# Patient Record
Sex: MALE | Race: OTHER | NOT HISPANIC OR LATINO
[De-identification: names, ages, dates, MRNs, and addresses within clinical notes are randomized per-mention and may not be internally consistent; named-entity substitution may affect disease eponyms.]

---

## 2022-02-26 ENCOUNTER — APPOINTMENT (OUTPATIENT)
Dept: PEDIATRICS | Facility: HOSPITAL | Age: 22
End: 2022-02-26
Payer: MEDICAID

## 2022-02-26 DIAGNOSIS — Z23 ENCOUNTER FOR IMMUNIZATION: ICD-10-CM

## 2022-02-26 PROBLEM — Z00.00 ENCOUNTER FOR PREVENTIVE HEALTH EXAMINATION: Status: ACTIVE | Noted: 2022-02-26

## 2022-02-26 PROCEDURE — 0054A: CPT

## 2022-02-27 PROBLEM — Z23 ENCOUNTER FOR IMMUNIZATION: Status: ACTIVE | Noted: 2022-02-27

## 2022-02-27 NOTE — HISTORY OF PRESENT ILLNESS
[COVID-19] : COVID-19 [FreeTextEntry1] : Patient here for COVID vaccine booster \par Administered 0.3ml of Pfizer vaccine in the right deltoid by Dr. Robin Cornell\par Observed for 15 minutes with no adverse reaction.

## 2022-06-17 ENCOUNTER — OUTPATIENT (OUTPATIENT)
Dept: OUTPATIENT SERVICES | Facility: HOSPITAL | Age: 22
LOS: 1 days | End: 2022-06-17
Payer: MEDICAID

## 2022-06-17 ENCOUNTER — APPOINTMENT (OUTPATIENT)
Dept: ULTRASOUND IMAGING | Facility: IMAGING CENTER | Age: 22
End: 2022-06-17
Payer: MEDICAID

## 2022-06-17 DIAGNOSIS — Z00.8 ENCOUNTER FOR OTHER GENERAL EXAMINATION: ICD-10-CM

## 2022-06-17 PROCEDURE — 76700 US EXAM ABDOM COMPLETE: CPT | Mod: 26

## 2022-06-17 PROCEDURE — 76700 US EXAM ABDOM COMPLETE: CPT

## 2022-09-26 ENCOUNTER — OFFICE (OUTPATIENT)
Dept: URBAN - METROPOLITAN AREA CLINIC 35 | Facility: CLINIC | Age: 22
Setting detail: OPHTHALMOLOGY
End: 2022-09-26
Payer: MEDICAID

## 2022-09-26 DIAGNOSIS — H57.13: ICD-10-CM

## 2022-09-26 PROCEDURE — 92002 INTRM OPH EXAM NEW PATIENT: CPT | Performed by: OPHTHALMOLOGY

## 2022-09-26 ASSESSMENT — VISUAL ACUITY
OS_BCVA: 20/20
OD_BCVA: 20/20

## 2022-09-26 ASSESSMENT — REFRACTION_AUTOREFRACTION
OD_CYLINDER: -0.25
OD_AXIS: 104
OS_CYLINDER: -0.25
OD_SPHERE: -0.25
OS_SPHERE: -0.25
OS_AXIS: 058

## 2022-09-26 ASSESSMENT — KERATOMETRY
OD_AXISANGLE_DEGREES: 084
OD_K2POWER_DIOPTERS: 43.50
OD_K1POWER_DIOPTERS: 42.75

## 2022-09-26 ASSESSMENT — SPHEQUIV_DERIVED
OS_SPHEQUIV: -0.375
OD_SPHEQUIV: -0.375

## 2022-09-26 ASSESSMENT — AXIALLENGTH_DERIVED: OD_AL: 23.8792

## 2022-09-26 ASSESSMENT — REFRACTION_MANIFEST
OD_VA1: 20/15
OS_CYLINDER: -0.25
OD_SPHERE: PLANO
OS_AXIS: 060
OS_VA1: 20/15
OS_SPHERE: PLANO

## 2022-10-15 ENCOUNTER — EMERGENCY (EMERGENCY)
Facility: HOSPITAL | Age: 22
LOS: 0 days | Discharge: HOME | End: 2022-10-15
Attending: EMERGENCY MEDICINE

## 2022-10-15 VITALS
DIASTOLIC BLOOD PRESSURE: 75 MMHG | SYSTOLIC BLOOD PRESSURE: 113 MMHG | HEART RATE: 81 BPM | OXYGEN SATURATION: 100 % | RESPIRATION RATE: 17 BRPM

## 2022-10-15 VITALS
OXYGEN SATURATION: 100 % | HEIGHT: 68 IN | TEMPERATURE: 98 F | HEART RATE: 92 BPM | RESPIRATION RATE: 16 BRPM | WEIGHT: 196.21 LBS | DIASTOLIC BLOOD PRESSURE: 102 MMHG | SYSTOLIC BLOOD PRESSURE: 139 MMHG

## 2022-10-15 DIAGNOSIS — R00.2 PALPITATIONS: ICD-10-CM

## 2022-10-15 DIAGNOSIS — R07.89 OTHER CHEST PAIN: ICD-10-CM

## 2022-10-15 LAB
ALBUMIN SERPL ELPH-MCNC: 4 G/DL — SIGNIFICANT CHANGE UP (ref 3.3–5)
ALP SERPL-CCNC: 76 U/L — SIGNIFICANT CHANGE UP (ref 40–120)
ALT FLD-CCNC: 100 U/L — HIGH (ref 12–78)
ANION GAP SERPL CALC-SCNC: 8 MMOL/L — SIGNIFICANT CHANGE UP (ref 5–17)
AST SERPL-CCNC: 37 U/L — SIGNIFICANT CHANGE UP (ref 15–37)
BASOPHILS # BLD AUTO: 0.03 K/UL — SIGNIFICANT CHANGE UP (ref 0–0.2)
BASOPHILS NFR BLD AUTO: 0.4 % — SIGNIFICANT CHANGE UP (ref 0–2)
BILIRUB SERPL-MCNC: 0.6 MG/DL — SIGNIFICANT CHANGE UP (ref 0.2–1.2)
BUN SERPL-MCNC: 9 MG/DL — SIGNIFICANT CHANGE UP (ref 7–23)
CALCIUM SERPL-MCNC: 9 MG/DL — SIGNIFICANT CHANGE UP (ref 8.5–10.1)
CHLORIDE SERPL-SCNC: 105 MMOL/L — SIGNIFICANT CHANGE UP (ref 96–108)
CK SERPL-CCNC: 94 U/L — SIGNIFICANT CHANGE UP (ref 26–308)
CO2 SERPL-SCNC: 26 MMOL/L — SIGNIFICANT CHANGE UP (ref 22–31)
CREAT SERPL-MCNC: 1.05 MG/DL — SIGNIFICANT CHANGE UP (ref 0.5–1.3)
D DIMER BLD IA.RAPID-MCNC: <150 NG/ML DDU — SIGNIFICANT CHANGE UP
EGFR: 104 ML/MIN/1.73M2 — SIGNIFICANT CHANGE UP
EOSINOPHIL # BLD AUTO: 0.18 K/UL — SIGNIFICANT CHANGE UP (ref 0–0.5)
EOSINOPHIL NFR BLD AUTO: 2.3 % — SIGNIFICANT CHANGE UP (ref 0–6)
GLUCOSE SERPL-MCNC: 96 MG/DL — SIGNIFICANT CHANGE UP (ref 70–99)
HCT VFR BLD CALC: 44.7 % — SIGNIFICANT CHANGE UP (ref 39–50)
HGB BLD-MCNC: 15.1 G/DL — SIGNIFICANT CHANGE UP (ref 13–17)
IMM GRANULOCYTES NFR BLD AUTO: 0.4 % — SIGNIFICANT CHANGE UP (ref 0–0.9)
LYMPHOCYTES # BLD AUTO: 2.88 K/UL — SIGNIFICANT CHANGE UP (ref 1–3.3)
LYMPHOCYTES # BLD AUTO: 36.8 % — SIGNIFICANT CHANGE UP (ref 13–44)
MCHC RBC-ENTMCNC: 26.7 PG — LOW (ref 27–34)
MCHC RBC-ENTMCNC: 33.8 G/DL — SIGNIFICANT CHANGE UP (ref 32–36)
MCV RBC AUTO: 79 FL — LOW (ref 80–100)
MONOCYTES # BLD AUTO: 0.5 K/UL — SIGNIFICANT CHANGE UP (ref 0–0.9)
MONOCYTES NFR BLD AUTO: 6.4 % — SIGNIFICANT CHANGE UP (ref 2–14)
NEUTROPHILS # BLD AUTO: 4.2 K/UL — SIGNIFICANT CHANGE UP (ref 1.8–7.4)
NEUTROPHILS NFR BLD AUTO: 53.7 % — SIGNIFICANT CHANGE UP (ref 43–77)
NRBC # BLD: 0 /100 WBCS — SIGNIFICANT CHANGE UP (ref 0–0)
PLATELET # BLD AUTO: 232 K/UL — SIGNIFICANT CHANGE UP (ref 150–400)
POTASSIUM SERPL-MCNC: 4.2 MMOL/L — SIGNIFICANT CHANGE UP (ref 3.5–5.3)
POTASSIUM SERPL-SCNC: 4.2 MMOL/L — SIGNIFICANT CHANGE UP (ref 3.5–5.3)
PROT SERPL-MCNC: 7.8 GM/DL — SIGNIFICANT CHANGE UP (ref 6–8.3)
RBC # BLD: 5.66 M/UL — SIGNIFICANT CHANGE UP (ref 4.2–5.8)
RBC # FLD: 13.7 % — SIGNIFICANT CHANGE UP (ref 10.3–14.5)
SODIUM SERPL-SCNC: 139 MMOL/L — SIGNIFICANT CHANGE UP (ref 135–145)
TROPONIN I, HIGH SENSITIVITY RESULT: 4.8 NG/L — SIGNIFICANT CHANGE UP
WBC # BLD: 7.82 K/UL — SIGNIFICANT CHANGE UP (ref 3.8–10.5)
WBC # FLD AUTO: 7.82 K/UL — SIGNIFICANT CHANGE UP (ref 3.8–10.5)

## 2022-10-15 PROCEDURE — 99285 EMERGENCY DEPT VISIT HI MDM: CPT

## 2022-10-15 PROCEDURE — 93010 ELECTROCARDIOGRAM REPORT: CPT

## 2022-10-15 PROCEDURE — 71046 X-RAY EXAM CHEST 2 VIEWS: CPT | Mod: 26

## 2022-10-15 NOTE — ED ADULT TRIAGE NOTE - CHIEF COMPLAINT QUOTE
brought to main for EKG Pt c/o "funny feeling in chest for 5 days on and off" Pt denies pain  denies having any SOB denies hx anxiety

## 2022-10-15 NOTE — ED PROVIDER NOTE - CLINICAL SUMMARY MEDICAL DECISION MAKING FREE TEXT BOX
21 year old with intermittent left sided chest pain.  No cardiac history.  DDx: muscular pain, r/o PNA; low suspicion for PE; acid reflux.  Will check EKG, Labs, CXR and Re-eval.

## 2022-10-15 NOTE — ED PROVIDER NOTE - PATIENT PORTAL LINK FT
You can access the FollowMyHealth Patient Portal offered by [facility] by registering at the following website: [email or web address]. By joining [facility]’s FollowMyHealth portal, you will also be able to view your health information using other applications (apps) compatible with our system.

## 2022-10-15 NOTE — ED ADULT NURSE NOTE - OBJECTIVE STATEMENT
Pt c/o "funny feeling in chest since Monday". As per pt, Cx does not hurt, it's just a funny feeling intermittently. No labored breathing. No PMHX. No diaphoretis noted. Pt AAOx4 appeared calm & comfortable.

## 2022-10-15 NOTE — ED PROVIDER NOTE - OBJECTIVE STATEMENT
This patient is a 21 year old man who presents to the ER c/o intermittent chest pain x 5 days.  He describes the pain as sharp associated with palpitations.  He denies SOB, fever, sick contacts, recent travel, leg swelling, cough and hemoptysis.  Patient has no pain at this current time.

## 2025-06-03 NOTE — ED PROVIDER NOTE - GASTROINTESTINAL, MLM
[facility]NC[facility]  NEUROLOGY CONSULT NOTE    REQUESTING PROVIDER:  Aníbal Moreno*    CHIEF COMPLAINT:  Dizziness, sensation of feeling off Balance.    SUBJECTIVE:  Rey Hall is a 67 year old right handed male with h/o HTN, Stroke, Lung cancer s/p lobectomy, chemotherapy , who was seen today for feeling off balance, since past five years. Had stroke in 2021, before which he started noticing sensation of feeling off balance, while taking shower, when closes his eyes feels off balance. Not a room spinning sensation. Happens intermittently everyday. Symptoms self resolve. Does not know if there any triggers, can happen randomly.  On December 13, 1999 he had a Head injury following which he developed some visual changes where he sees bright circles (like donuts), which happen independent of dizziness/off balance and there are no associated headaches with it. Denies one-sided weakness, speech, swallowing or chewing difficulties, denies bowel/bladder incontinence.      Stroke History, 2021:  CT neg. MRI revealed right thalamic infarct and moderate small vessel disease. MRA of neck was significant for left vertebral artery occlusion. Echo revealed EF 60% and no evidence of shunt. , 150. A1C 5.5. Discharged on  mg, amlodipine, and Lipitor 80 mg.       Holter monitor demonstrating sinus rhythm, 2024:  Paroxysm of atrial fibrillation, 7 beats at a rate of 147 bpm at 4:33 AM on 11/18.  There was a 7 beats run of multifocal atrial tachycardia at a rate of 115 bpm at 4:31 PM on 11/06.      HISTORIES:  Past Medical History:   Diagnosis Date   • Coronary atherosclerosis 03/03/2022   • CVA (cerebral vascular accident)  (CMD) 09/25/2020   • Fracture     nose, fingers and toes   • High cholesterol    • HTN (hypertension) 04/06/2021   • Large cell carcinoma  (CMD) 07/13/2023    lung   • Left sided numbness 08/15/2020   • Lung nodule 10/31/2022   • PAD (peripheral artery disease) (CMD)  09/25/2020   • S/P robotic left thoracoscopy (VATS) lower lobectomy with lymph node dissection 07/19/2023   • Tear of retina    • Vertigo 08/20/2020   • Wears prescription eyeglasses      Past Surgical History:   Procedure Laterality Date   • Appendectomy     • Bronchoscopy N/A     BRONCHOSCOPY WITH COMPUTER ASSISTED,IMAGE GUIDED NAVIGATION,ROBOT ASSISTED   • Bronchoscopy N/A 02/29/2024   • Colonoscopy  02/23/2021   • Eye surgery      right torn retina   • Hand reconstruction     • Ir implantable port vein access     • Lung lobectomy Left 07/19/2023    robotic left thoracoscopy (VATS) lower lobectomy with lymph node dissection   • Shoulder surgery       ALLERGIES:  No Known Allergies  Current Outpatient Medications   Medication Sig Dispense Refill   • Alpha-Lipoic Acid 600 MG Tab Take 600 mg by mouth daily. 30 tablet 3   • carvedilol (COREG) 6.25 MG tablet TAKE 1 TABLET BY MOUTH IN THE MORNING AND IN THE EVENING WITH MEALS 180 tablet 1   • triamcinolone (ARISTOCORT) 0.1 % ointment Apply 1 Application topically in the morning and 1 Application in the evening. 30 g 0   • amLODIPine (NORVASC) 10 MG tablet TAKE 1 TABLET BY MOUTH DAILY (Patient taking differently: Take 5 mg by mouth daily.) 90 tablet 1   • atorvastatin (LIPITOR) 40 MG tablet TAKE 1 TABLET BY MOUTH EVERY NIGHT 90 tablet 1   • budesonide-formoterol (SYMBICORT) 160-4.5 MCG/ACT inhaler Inhale 2 puffs into the lungs in the morning and 2 puffs in the evening. 10.2 g 11   • doxycycline hyclate (VIBRAMYCIN) 100 MG capsule Take 1 capsule by mouth in the morning and 1 capsule in the evening. 20 capsule 0   • albuterol (Proventil HFA) 108 (90 Base) MCG/ACT inhaler Inhale 2 puffs into the lungs every 4 hours as needed for Shortness of Breath or Wheezing. 18 g 11   • cilostazol (PLETAL) 50 MG tablet Take 1 tablet by mouth in the morning and 1 tablet in the evening. 180 tablet 1   • Coenzyme Q10 (Co Q10) 100 MG Cap Take by mouth daily.     • aspirin (ECOTRIN) 325 MG  EC tablet TAKE 1 TABLET BY MOUTH DAILY 90 tablet 3     No current facility-administered medications for this visit.     Family History   Problem Relation Age of Onset   • Hypertension Mother    • Hypertension Father    • Clotting Disorder Brother         PE   • Cancer Neg Hx      Social History     Tobacco Use   • Smoking status: Former     Current packs/day: 0.00     Average packs/day: 1.5 packs/day for 40.0 years (60.0 ttl pk-yrs)     Types: Cigarettes     Start date: 1980     Quit date: 2020     Years since quittin.8   • Smokeless tobacco: Never   Vaping Use   • Vaping status: never used   Substance Use Topics   • Alcohol use: Yes     Alcohol/week: 4.0 - 5.0 standard drinks of alcohol     Types: 4 - 5 Glasses of wine per week     Comment: social   • Drug use: Never       REVIEW OF SYSTEMS:  GENERAL:  Patient denies fever, chills or recent illness.  EYES:  Patient denies loss of vision or double vision.  ENT/MOUTH:  Patient denies ear infections, sore throats, sinus problems, hearing loss.  CARDIOVASCULAR:  Patient denies chest pain or palpitations.  RESPIRATORY:  Patient denies wheezing, frequent cough, shortness of breath.  GASTROINTESTINAL:  Patient denies abdominal pain, nausea/vomiting, indigestion/heartburn, diarrhea, constipation.  GENITOURINARY:  Patient denies urine retention, painful urination, urinary frequency, blood in urine, nocturia.  SKIN:  Patient denies skin rashes.  MUSCULOSKELETAL:  Patient denies joint pain, neck pain, back pain, leg swelling.  PSYCHIATRIC: Patient denies recent changes in mood.  NEUROLOGIC: Patient denies symptoms except as described in the History of Present Illness.    PHYSICAL EXAM:  Visit Vitals  BP (!) 140/80   Pulse 91   Ht 5' 11\" (1.803 m)   Wt 89.8 kg (198 lb)   SpO2 96%   BMI 27.62 kg/m²     GENERAL APPEARANCE: Patient is pleasant, cooperative in no apparent distress.  HEART: Regular rhythm with no murmurs.      NEUROLOGIC:  Mentation: Alert orientated  and cooperative. Affect is appropriate. No evidence of cognitive impairment. General fund of knowledge good.  Language function is intact without aphasia errors.    Cranial nerves:  CN I: Not tested.    CN II: Visual acuity grossly intact. Visual fields full to confrontational testing.     CN III, IV, VI: Pupils are equal, reactive to light and accommodation. Extraocular movements full, without nystagmus or gaze paresis. There is no ptosis.    CN V: Facial sensation intact to light touch.    CN VII: Facial movement full and symmetric.    CN VIII: Hearing intact to a speaking voice.    CN IX, X: Palate elevates in the midline, swallow and phonation are normal.    CN XI: Sternocleidomastoid are symmetrical with normal movement.    CN XII: Tongue is in the midline without atrophy or fasciculations.    Motor: No abnormal movements noticed. Muscle mass, tone and strength are intact over all extremities. No atrophy or fasciculations noted.       R/L  R/L   Deltoid 5/5 Hip Flexion/iliopsoas 5/5   Elbow Flexion 5/5 Knee Extension 5/5   Elbow Extension 5/5 Knee Flexion 5/5   Wrist Flexion 5/5 Ankle Dorsiflexion 5/5   Wrist Extension 5/5 Ankle Plantarflexion 5/5   Finger Extensors 5/5 Foot Inversion */*   Finger Flexors 5/5 Foot Eversion */*   APB */* EHL */*   * not tested    Sensory: Sensation to  light touch, and cold temperature is intact and symmetric in all extremities.       Cerebellar: Rapid hand movement is normal. Finger to nose is intact bilaterally without dysmetria or tremor.   Fine finger movements are normal.    Gait: Gait was normal based without ataxia.      LABORATORY DATA:  Lab Results   Component Value Date/Time    HGB 14.2 05/06/2025 02:48 PM    HCT 41.2 05/06/2025 02:48 PM    SODIUM 139 05/06/2025 02:48 PM    POTASSIUM 4.2 05/06/2025 02:48 PM    CREATININE 0.83 05/06/2025 02:48 PM    CHOLESTEROL 199 04/02/2025 09:32 AM         IMAGING STUDIES:  MRI brain, 2020:  1.  Punctate focus of acute  ischemia/infarct involves the right thalamus.  2.  Moderate small vessel chronic ischemic disease changes.  MRA neck:  1.  The left vertebral artery is occluded at its origin.  2.  Remaining vessels are widely patent.      ASSESSMENT/PLAN:     67 year old right handed male with h/o HTN, Stroke, Lung cancer s/p lobectomy, chemotherapy , who was seen today for feeling off balance since past five years. Had stroke in 2021, before which he started noticing sensation of feeling off balance, while taking shower, when closes his eyes feels off balance. Not a room spinning sensation. Happens intermittently everyday. Symptoms self resolve. Does not know if there any triggers, can happen randomly.       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 Can switch to Low dose Aspirin, 81 mg, if AC is started.  Risk Factor modification, LDL<70, HgbA1c<6,BP<=140/90, avoid smoking, alcohol.  CTA head, neck  Labs for SPEP, JAXSON,MENA,ACE, vitamin b12 levels  Physical exercise 20 minutes per day  Consumption of diet rich in whole grains, vegetables, fruits, omega 3 fatty acids      RTC in one year.    Marianela Verdugo MD    Abdomen soft, non-tender, no guarding.